# Patient Record
Sex: FEMALE | Race: OTHER | NOT HISPANIC OR LATINO | URBAN - METROPOLITAN AREA
[De-identification: names, ages, dates, MRNs, and addresses within clinical notes are randomized per-mention and may not be internally consistent; named-entity substitution may affect disease eponyms.]

---

## 2015-06-04 RX ORDER — FAMOTIDINE 10 MG/ML
1 INJECTION INTRAVENOUS
Qty: 0 | Refills: 0 | COMMUNITY
Start: 2015-06-04

## 2017-12-01 ENCOUNTER — OUTPATIENT (OUTPATIENT)
Dept: OUTPATIENT SERVICES | Facility: HOSPITAL | Age: 82
LOS: 1 days | End: 2017-12-01
Payer: MEDICARE

## 2017-12-01 PROCEDURE — G9001: CPT

## 2017-12-18 ENCOUNTER — INPATIENT (INPATIENT)
Facility: HOSPITAL | Age: 82
LOS: 3 days | Discharge: HOME | End: 2017-12-22
Attending: HOSPITALIST | Admitting: INTERNAL MEDICINE
Payer: MEDICARE

## 2017-12-18 VITALS
WEIGHT: 59.97 LBS | RESPIRATION RATE: 24 BRPM | OXYGEN SATURATION: 91 % | HEART RATE: 101 BPM | HEIGHT: 63 IN | DIASTOLIC BLOOD PRESSURE: 36 MMHG | SYSTOLIC BLOOD PRESSURE: 68 MMHG

## 2017-12-18 LAB
APTT BLD: 31.9 SEC — SIGNIFICANT CHANGE UP (ref 27.5–37.4)
INR BLD: 1.11 RATIO — SIGNIFICANT CHANGE UP (ref 0.88–1.16)
PROTHROM AB SERPL-ACNC: 12.1 SEC — SIGNIFICANT CHANGE UP (ref 9.8–12.7)

## 2017-12-18 PROCEDURE — 99291 CRITICAL CARE FIRST HOUR: CPT

## 2017-12-18 RX ORDER — VANCOMYCIN HCL 1 G
750 VIAL (EA) INTRAVENOUS ONCE
Qty: 0 | Refills: 0 | Status: COMPLETED | OUTPATIENT
Start: 2017-12-18 | End: 2017-12-18

## 2017-12-18 RX ORDER — PIPERACILLIN AND TAZOBACTAM 4; .5 G/20ML; G/20ML
3.38 INJECTION, POWDER, LYOPHILIZED, FOR SOLUTION INTRAVENOUS ONCE
Qty: 0 | Refills: 0 | Status: COMPLETED | OUTPATIENT
Start: 2017-12-18 | End: 2017-12-18

## 2017-12-18 RX ORDER — SODIUM CHLORIDE 9 MG/ML
3 INJECTION INTRAMUSCULAR; INTRAVENOUS; SUBCUTANEOUS ONCE
Qty: 0 | Refills: 0 | Status: COMPLETED | OUTPATIENT
Start: 2017-12-18 | End: 2017-12-18

## 2017-12-18 RX ORDER — ACETAMINOPHEN 500 MG
650 TABLET ORAL ONCE
Qty: 0 | Refills: 0 | Status: COMPLETED | OUTPATIENT
Start: 2017-12-18 | End: 2017-12-18

## 2017-12-18 RX ORDER — SODIUM CHLORIDE 9 MG/ML
1000 INJECTION INTRAMUSCULAR; INTRAVENOUS; SUBCUTANEOUS ONCE
Qty: 0 | Refills: 0 | Status: COMPLETED | OUTPATIENT
Start: 2017-12-18 | End: 2017-12-18

## 2017-12-18 RX ADMIN — SODIUM CHLORIDE 3 MILLILITER(S): 9 INJECTION INTRAMUSCULAR; INTRAVENOUS; SUBCUTANEOUS at 23:57

## 2017-12-18 NOTE — ED ADULT NURSE NOTE - OBJECTIVE STATEMENT
89 y/o female PMHx hypothyroidism, HTN presents to the ED with weakness, as per daughters patient has not been eating or drinking for three days and has been groaning 89 y/o female PMHx hypothyroidism, HTN presents to the ED with weakness, as per daughters patient has not been eating or drinking for three days and has been groaning. L- arm contracted, b/l leg contracted

## 2017-12-19 LAB
ALBUMIN SERPL ELPH-MCNC: 2 G/DL — LOW (ref 3.3–5)
ALP SERPL-CCNC: 67 U/L — SIGNIFICANT CHANGE UP (ref 40–120)
ALT FLD-CCNC: 46 U/L — SIGNIFICANT CHANGE UP (ref 12–78)
ANION GAP SERPL CALC-SCNC: 7 MMOL/L — SIGNIFICANT CHANGE UP (ref 5–17)
ANISOCYTOSIS BLD QL: SLIGHT — SIGNIFICANT CHANGE UP
APPEARANCE UR: CLEAR — SIGNIFICANT CHANGE UP
AST SERPL-CCNC: 23 U/L — SIGNIFICANT CHANGE UP (ref 15–37)
BACTERIA # UR AUTO: ABNORMAL
BASOPHILS NFR BLD AUTO: 1 % — SIGNIFICANT CHANGE UP (ref 0–2)
BILIRUB SERPL-MCNC: 0.5 MG/DL — SIGNIFICANT CHANGE UP (ref 0.2–1.2)
BILIRUB UR-MCNC: NEGATIVE — SIGNIFICANT CHANGE UP
BUN SERPL-MCNC: 101 MG/DL — HIGH (ref 7–23)
BUN SERPL-MCNC: 70 MG/DL — HIGH (ref 7–23)
BUN SERPL-MCNC: 81 MG/DL — HIGH (ref 7–23)
BUN SERPL-MCNC: 91 MG/DL — HIGH (ref 7–23)
CA-I BLD-SCNC: 1.18 MMOL/L — SIGNIFICANT CHANGE UP (ref 1.12–1.3)
CALCIUM SERPL-MCNC: 7.1 MG/DL — LOW (ref 8.5–10.1)
CALCIUM SERPL-MCNC: 7.2 MG/DL — LOW (ref 8.5–10.1)
CALCIUM SERPL-MCNC: 7.6 MG/DL — LOW (ref 8.5–10.1)
CALCIUM SERPL-MCNC: 8.3 MG/DL — LOW (ref 8.5–10.1)
CHLORIDE SERPL-SCNC: 142 MMOL/L — HIGH (ref 96–108)
CHLORIDE SERPL-SCNC: 144 MMOL/L — HIGH (ref 96–108)
CHLORIDE SERPL-SCNC: 147 MMOL/L — HIGH (ref 96–108)
CHLORIDE SERPL-SCNC: 147 MMOL/L — HIGH (ref 96–108)
CK MB BLD-MCNC: <1 % — SIGNIFICANT CHANGE UP (ref 0–3.5)
CK MB CFR SERPL CALC: <0.5 NG/ML — SIGNIFICANT CHANGE UP (ref 0.5–3.6)
CK SERPL-CCNC: 49 U/L — SIGNIFICANT CHANGE UP (ref 26–192)
CO2 SERPL-SCNC: 22 MMOL/L — SIGNIFICANT CHANGE UP (ref 22–31)
CO2 SERPL-SCNC: 23 MMOL/L — SIGNIFICANT CHANGE UP (ref 22–31)
CO2 SERPL-SCNC: 24 MMOL/L — SIGNIFICANT CHANGE UP (ref 22–31)
CO2 SERPL-SCNC: 25 MMOL/L — SIGNIFICANT CHANGE UP (ref 22–31)
COLOR SPEC: YELLOW — SIGNIFICANT CHANGE UP
COMMENT - URINE: SIGNIFICANT CHANGE UP
CREAT SERPL-MCNC: 1.58 MG/DL — HIGH (ref 0.5–1.3)
CREAT SERPL-MCNC: 1.83 MG/DL — HIGH (ref 0.5–1.3)
CREAT SERPL-MCNC: 1.94 MG/DL — HIGH (ref 0.5–1.3)
CREAT SERPL-MCNC: 2.29 MG/DL — HIGH (ref 0.5–1.3)
DIFF PNL FLD: ABNORMAL
DOHLE BOD BLD QL SMEAR: PRESENT — SIGNIFICANT CHANGE UP
EPI CELLS # UR: ABNORMAL
GLUCOSE SERPL-MCNC: 162 MG/DL — HIGH (ref 70–99)
GLUCOSE SERPL-MCNC: 211 MG/DL — HIGH (ref 70–99)
GLUCOSE SERPL-MCNC: 225 MG/DL — HIGH (ref 70–99)
GLUCOSE SERPL-MCNC: 241 MG/DL — HIGH (ref 70–99)
GLUCOSE UR QL: NEGATIVE MG/DL — SIGNIFICANT CHANGE UP
HCT VFR BLD CALC: 33.4 % — LOW (ref 34.5–45)
HCT VFR BLD CALC: 36.7 % — SIGNIFICANT CHANGE UP (ref 34.5–45)
HGB BLD-MCNC: 10.1 G/DL — LOW (ref 11.5–15.5)
HGB BLD-MCNC: 11.6 G/DL — SIGNIFICANT CHANGE UP (ref 11.5–15.5)
HYPOCHROMIA BLD QL: SLIGHT — SIGNIFICANT CHANGE UP
KETONES UR-MCNC: NEGATIVE — SIGNIFICANT CHANGE UP
LACTATE SERPL-SCNC: 3.3 MMOL/L — HIGH (ref 0.7–2)
LEUKOCYTE ESTERASE UR-ACNC: ABNORMAL
LIDOCAIN IGE QN: 142 U/L — SIGNIFICANT CHANGE UP (ref 73–393)
LYMPHOCYTES # BLD AUTO: 17 % — SIGNIFICANT CHANGE UP (ref 13–44)
MACROCYTES BLD QL: SLIGHT — SIGNIFICANT CHANGE UP
MAGNESIUM SERPL-MCNC: 3.3 MG/DL — HIGH (ref 1.6–2.6)
MCHC RBC-ENTMCNC: 30 PG — SIGNIFICANT CHANGE UP (ref 27–34)
MCHC RBC-ENTMCNC: 30.1 GM/DL — LOW (ref 32–36)
MCHC RBC-ENTMCNC: 31.3 PG — SIGNIFICANT CHANGE UP (ref 27–34)
MCHC RBC-ENTMCNC: 31.6 GM/DL — LOW (ref 32–36)
MCV RBC AUTO: 98.9 FL — SIGNIFICANT CHANGE UP (ref 80–100)
MCV RBC AUTO: 99.7 FL — SIGNIFICANT CHANGE UP (ref 80–100)
MICROCYTES BLD QL: SLIGHT — SIGNIFICANT CHANGE UP
MONOCYTES NFR BLD AUTO: 3 % — SIGNIFICANT CHANGE UP (ref 2–14)
NEUTROPHILS NFR BLD AUTO: 73 % — SIGNIFICANT CHANGE UP (ref 43–77)
NEUTS BAND # BLD: 6 % — SIGNIFICANT CHANGE UP (ref 0–8)
NITRITE UR-MCNC: NEGATIVE — SIGNIFICANT CHANGE UP
OVALOCYTES BLD QL SMEAR: SLIGHT — SIGNIFICANT CHANGE UP
PH UR: 5 — SIGNIFICANT CHANGE UP (ref 5–8)
PHOSPHATE SERPL-MCNC: 2.9 MG/DL — SIGNIFICANT CHANGE UP (ref 2.5–4.5)
PLAT MORPH BLD: NORMAL — SIGNIFICANT CHANGE UP
PLATELET # BLD AUTO: 122 K/UL — LOW (ref 150–400)
PLATELET # BLD AUTO: 137 K/UL — LOW (ref 150–400)
POTASSIUM SERPL-MCNC: 3.3 MMOL/L — LOW (ref 3.5–5.3)
POTASSIUM SERPL-MCNC: 3.4 MMOL/L — LOW (ref 3.5–5.3)
POTASSIUM SERPL-MCNC: 3.7 MMOL/L — SIGNIFICANT CHANGE UP (ref 3.5–5.3)
POTASSIUM SERPL-MCNC: 4.4 MMOL/L — SIGNIFICANT CHANGE UP (ref 3.5–5.3)
POTASSIUM SERPL-SCNC: 3.3 MMOL/L — LOW (ref 3.5–5.3)
POTASSIUM SERPL-SCNC: 3.4 MMOL/L — LOW (ref 3.5–5.3)
POTASSIUM SERPL-SCNC: 3.7 MMOL/L — SIGNIFICANT CHANGE UP (ref 3.5–5.3)
POTASSIUM SERPL-SCNC: 4.4 MMOL/L — SIGNIFICANT CHANGE UP (ref 3.5–5.3)
PROT SERPL-MCNC: 7.8 GM/DL — SIGNIFICANT CHANGE UP (ref 6–8.3)
PROT UR-MCNC: 30 MG/DL
RBC # BLD: 3.35 M/UL — LOW (ref 3.8–5.2)
RBC # BLD: 3.71 M/UL — LOW (ref 3.8–5.2)
RBC # FLD: 16 % — HIGH (ref 11–15)
RBC # FLD: 16.2 % — HIGH (ref 11–15)
RBC BLD AUTO: SIGNIFICANT CHANGE UP
RBC CASTS # UR COMP ASSIST: ABNORMAL /HPF (ref 0–4)
SMUDGE CELLS # BLD: PRESENT — SIGNIFICANT CHANGE UP
SODIUM SERPL-SCNC: 172 MMOL/L — CRITICAL HIGH (ref 135–145)
SODIUM SERPL-SCNC: 173 MMOL/L — CRITICAL HIGH (ref 135–145)
SODIUM SERPL-SCNC: 178 MMOL/L — CRITICAL HIGH (ref 135–145)
SODIUM SERPL-SCNC: 179 MMOL/L — CRITICAL HIGH (ref 135–145)
SP GR SPEC: 1.02 — SIGNIFICANT CHANGE UP (ref 1.01–1.02)
T3 SERPL-MCNC: 54 NG/DL — LOW (ref 80–200)
T4 AB SER-ACNC: 4.6 UG/DL — SIGNIFICANT CHANGE UP (ref 4.6–12)
TOXIC GRANULES BLD QL SMEAR: PRESENT — SIGNIFICANT CHANGE UP
TROPONIN I SERPL-MCNC: 0.06 NG/ML — HIGH (ref 0.01–0.04)
TSH SERPL-MCNC: 0.74 UIU/ML — SIGNIFICANT CHANGE UP (ref 0.36–3.74)
UROBILINOGEN FLD QL: NEGATIVE MG/DL — SIGNIFICANT CHANGE UP
WBC # BLD: 17.6 K/UL — HIGH (ref 3.8–10.5)
WBC # BLD: 19 K/UL — HIGH (ref 3.8–10.5)
WBC # FLD AUTO: 17.6 K/UL — HIGH (ref 3.8–10.5)
WBC # FLD AUTO: 19 K/UL — HIGH (ref 3.8–10.5)
WBC UR QL: SIGNIFICANT CHANGE UP

## 2017-12-19 PROCEDURE — 71010: CPT | Mod: 26

## 2017-12-19 PROCEDURE — 70450 CT HEAD/BRAIN W/O DYE: CPT | Mod: 26

## 2017-12-19 PROCEDURE — 93010 ELECTROCARDIOGRAM REPORT: CPT

## 2017-12-19 RX ORDER — POTASSIUM CHLORIDE 20 MEQ
10 PACKET (EA) ORAL ONCE
Qty: 0 | Refills: 0 | Status: COMPLETED | OUTPATIENT
Start: 2017-12-19 | End: 2017-12-19

## 2017-12-19 RX ORDER — SODIUM CHLORIDE 9 MG/ML
1000 INJECTION, SOLUTION INTRAVENOUS
Qty: 0 | Refills: 0 | Status: DISCONTINUED | OUTPATIENT
Start: 2017-12-19 | End: 2017-12-19

## 2017-12-19 RX ORDER — SODIUM CHLORIDE 9 MG/ML
1000 INJECTION, SOLUTION INTRAVENOUS
Qty: 0 | Refills: 0 | Status: DISCONTINUED | OUTPATIENT
Start: 2017-12-19 | End: 2017-12-21

## 2017-12-19 RX ORDER — POTASSIUM CHLORIDE 20 MEQ
10 PACKET (EA) ORAL
Qty: 0 | Refills: 0 | Status: COMPLETED | OUTPATIENT
Start: 2017-12-19 | End: 2017-12-20

## 2017-12-19 RX ORDER — LEVOTHYROXINE SODIUM 125 MCG
12.5 TABLET ORAL DAILY
Qty: 0 | Refills: 0 | Status: DISCONTINUED | OUTPATIENT
Start: 2017-12-19 | End: 2017-12-22

## 2017-12-19 RX ORDER — SODIUM CHLORIDE 9 MG/ML
1000 INJECTION INTRAMUSCULAR; INTRAVENOUS; SUBCUTANEOUS ONCE
Qty: 0 | Refills: 0 | Status: COMPLETED | OUTPATIENT
Start: 2017-12-19 | End: 2017-12-19

## 2017-12-19 RX ORDER — MIRTAZAPINE 45 MG/1
15 TABLET, ORALLY DISINTEGRATING ORAL AT BEDTIME
Qty: 0 | Refills: 0 | Status: DISCONTINUED | OUTPATIENT
Start: 2017-12-19 | End: 2017-12-22

## 2017-12-19 RX ORDER — HEPARIN SODIUM 5000 [USP'U]/ML
5000 INJECTION INTRAVENOUS; SUBCUTANEOUS EVERY 8 HOURS
Qty: 0 | Refills: 0 | Status: DISCONTINUED | OUTPATIENT
Start: 2017-12-19 | End: 2017-12-22

## 2017-12-19 RX ORDER — MORPHINE SULFATE 50 MG/1
1 CAPSULE, EXTENDED RELEASE ORAL ONCE
Qty: 0 | Refills: 0 | Status: DISCONTINUED | OUTPATIENT
Start: 2017-12-19 | End: 2017-12-19

## 2017-12-19 RX ADMIN — HEPARIN SODIUM 5000 UNIT(S): 5000 INJECTION INTRAVENOUS; SUBCUTANEOUS at 22:51

## 2017-12-19 RX ADMIN — Medication 650 MILLIGRAM(S): at 00:02

## 2017-12-19 RX ADMIN — SODIUM CHLORIDE 1000 MILLILITER(S): 9 INJECTION INTRAMUSCULAR; INTRAVENOUS; SUBCUTANEOUS at 08:59

## 2017-12-19 RX ADMIN — Medication 100 MILLIEQUIVALENT(S): at 00:51

## 2017-12-19 RX ADMIN — Medication 100 MILLIEQUIVALENT(S): at 23:15

## 2017-12-19 RX ADMIN — Medication 100 MILLIEQUIVALENT(S): at 14:16

## 2017-12-19 RX ADMIN — SODIUM CHLORIDE 1000 MILLILITER(S): 9 INJECTION INTRAMUSCULAR; INTRAVENOUS; SUBCUTANEOUS at 00:02

## 2017-12-19 RX ADMIN — PIPERACILLIN AND TAZOBACTAM 100 GRAM(S): 4; .5 INJECTION, POWDER, LYOPHILIZED, FOR SOLUTION INTRAVENOUS at 00:02

## 2017-12-19 RX ADMIN — Medication 150 MILLIGRAM(S): at 01:51

## 2017-12-19 RX ADMIN — MORPHINE SULFATE 1 MILLIGRAM(S): 50 CAPSULE, EXTENDED RELEASE ORAL at 13:27

## 2017-12-19 RX ADMIN — HEPARIN SODIUM 5000 UNIT(S): 5000 INJECTION INTRAVENOUS; SUBCUTANEOUS at 13:27

## 2017-12-19 RX ADMIN — Medication 12.5 MICROGRAM(S): at 08:54

## 2017-12-19 RX ADMIN — SODIUM CHLORIDE 100 MILLILITER(S): 9 INJECTION, SOLUTION INTRAVENOUS at 08:59

## 2017-12-19 RX ADMIN — SODIUM CHLORIDE 100 MILLILITER(S): 9 INJECTION, SOLUTION INTRAVENOUS at 08:28

## 2017-12-19 RX ADMIN — HEPARIN SODIUM 5000 UNIT(S): 5000 INJECTION INTRAVENOUS; SUBCUTANEOUS at 06:14

## 2017-12-19 RX ADMIN — MORPHINE SULFATE 1 MILLIGRAM(S): 50 CAPSULE, EXTENDED RELEASE ORAL at 13:57

## 2017-12-19 RX ADMIN — SODIUM CHLORIDE 75 MILLILITER(S): 9 INJECTION, SOLUTION INTRAVENOUS at 05:21

## 2017-12-19 RX ADMIN — SODIUM CHLORIDE 100 MILLILITER(S): 9 INJECTION, SOLUTION INTRAVENOUS at 18:48

## 2017-12-19 NOTE — PROVIDER CONTACT NOTE (EICU) - BACKGROUND
89 year old female with dementia, hypothryoism pancreatic tumor who p/w AMS. Noted to have FTT with DARIO, severe hypernatremia

## 2017-12-19 NOTE — ED PROVIDER NOTE - OBJECTIVE STATEMENT
Pertinent PMH/PSH/FHx/SHx and Review of Systems contained within:  89 yo bedbound f with PMH of dementia, hypothyroidism and htn BIB family for 4 day history of lethary and anorexia. As per family, patient usually able to answer questions but now only moans as if in pain.

## 2017-12-19 NOTE — ED PROVIDER NOTE - CARE PLAN
Principal Discharge DX:	Sepsis  Secondary Diagnosis:	Failure to thrive in adult Principal Discharge DX:	Hypernatremia  Secondary Diagnosis:	Failure to thrive in adult  Secondary Diagnosis:	UTI (urinary tract infection)

## 2017-12-19 NOTE — H&P ADULT - ATTENDING COMMENTS
pt seen and examined at the bedside during am rounds. pt admitted with hypernatremia, now on IVF, hypernatremia improving, cont to monitor, pt otherwise HD stable. pt is DNR/DNI, family doesn't want any aggressive measures.

## 2017-12-19 NOTE — ED PROVIDER NOTE - CRITICAL CARE PROVIDED
consultation with other physicians/direct patient care (not related to procedure)/conducted a detailed discussion of DNR status/additional history taking/interpretation of diagnostic studies/documentation/consult w/ pt's family directly relating to pts condition

## 2017-12-19 NOTE — H&P ADULT - NSHPPHYSICALEXAM_GEN_ALL_CORE
· CONSTITUTIONAL: cachectic, awake, moans with touch  · ENMT: Airway patent, Nasal mucosa clear. Mouth with normal mucosa. Throat has no vesicles, no oropharyngeal exudates and uvula is midline.  · EYES: Clear bilaterally, pupils equal, round and reactive to light.  · CARDIAC: Normal rate, regular rhythm.  Heart sounds S1, S2.  No murmurs, rubs or gallops.  · RESPIRATORY: Breath sounds clear and equal bilaterally.  · GASTROINTESTINAL: Abdomen soft, non-tender, no guarding.  · MUSCULOSKELETAL: Spine appears normal, atrophied and rigid extremities, no muscle or joint tenderness  · NEUROLOGICAL: no focal deficits, no motor deficits.  · SKIN: Skin normal color for race, warm, dry and intact. No evidence of rash.

## 2017-12-19 NOTE — H&P ADULT - ASSESSMENT
Assessment:  89F w/ Failure to Thrive, Dehydration, Electrolyte Disturbances including Hypernatremia.    - Admit to ICU for monitoring, hydration, normalization of electrolyte disturbances  - IV hydration with D5W for hypernatremia  - Await Urine culture results  - BMP q6h Assessment:  89F w/ Failure to Thrive, Dehydration, Electrolyte Disturbances including Hypernatremia.    - Admit to ICU for monitoring, hydration, normalization of electrolyte disturbances  - IV hydration with D5W for hypernatremia  - Await Urine culture results  - BMP q6h  - goals of care discussion with family

## 2017-12-19 NOTE — CHART NOTE - NSCHARTNOTEFT_GEN_A_CORE
Patient is a 89 year old female with history of Dementia, bed bound, Hypothyroidism, HTN, pancreatic tumor, DNR/ DNI brought in by family for increase lethargy and failure to thrive. In ED found Na of 159  given 2L of normal saline boluses, and now on D51/2 NS @100ml/hr,   Na slowly trending down from 179--178--173      signed out to Hospitalist.

## 2017-12-19 NOTE — H&P ADULT - HISTORY OF PRESENT ILLNESS
· Chief Complaint: The patient is a 89y Female complaining of weakness.  · HPI Objective Statement: Pertinent PMH/PSH/FHx/SHx and Review of Systems contained within:  89 yo bedbound f with PMH of dementia, hypothyroidism and htn BIB family for 4 day history of lethary and anorexia. As per family, patient usually able to answer questions but now only moans as if in pain.  Patient is a 89 yo female with PMHx HTN, Hypothyroid, Dementia (bed bound and minimally verbal at baseline) who was brought in by family with 4 day history of increased lethargy and anorexia. Patient normally answers yes and no questions and recently has been only moaning in response to questions or pain. Has not been eating or as interactive as normal. Labs on admission show Na 179, Cl 147 and BUN / Cr of 101 / 2.29.  Patient given IVF and IV Zosyn and Vanco in the ED.

## 2017-12-19 NOTE — H&P ADULT - NSHPLABSRESULTS_GEN_ALL_CORE
11.6   19.0  )-----------( 137      ( 18 Dec 2017 23:44 )             36.7     12-18    179<HH>  |  147<H>  |  101<H>  ----------------------------<  241<H>  4.4   |  25  |  2.29<H>    Ca    8.3<L>      18 Dec 2017 23:44    TPro  7.8  /  Alb  2.0<L>  /  TBili  0.5  /  DBili  x   /  AST  23  /  ALT  46  /  AlkPhos  67  12    PT/INR - ( 18 Dec 2017 23:44 )   PT: 12.1 sec;   INR: 1.11 ratio         PTT - ( 18 Dec 2017 23:44 )  PTT:31.9 sec    Urinalysis Basic - ( 19 Dec 2017 02:55 )    Color: Yellow / Appearance: Clear / S.020 / pH: x  Gluc: x / Ketone: Negative  / Bili: Negative / Urobili: Negative mg/dL   Blood: x / Protein: 30 mg/dL / Nitrite: Negative   Leuk Esterase: Trace / RBC: 25-50 /HPF / WBC 3-5   Sq Epi: x / Non Sq Epi: Moderate / Bacteria: Many      < from: CT Head No Cont (17 @ 02:37) >    EXAM:  CT BRAIN                          PROCEDURE DATE:  2017    INTERPRETATION:  2017 3:09 AM    CLINICAL HISTORY:  Altered mental status.    TECHNIQUE: Axial CT images are obtained from the cranial vertex to the   skullbase without the administration of IV contrast.    COMPARISON: CT 2009 and brain MRI 2009    FINDINGS:  There is volume loss and atherosclerosis. White matter hypodensities   noted compatible with severe chronic microvascular ischemic change in   this age group. There is no midline shift, mass effect, extra axial   collection, intracranial hemorrhage, or ventriculomegaly. There are   likely chronic lacunar infarcts along the basal ganglia.    The calvarium is intact. The visualized paranasal sinuses are aerated.   The mastoid air cells are clear.    IMPRESSION:    No acute hemorrhage or mass effect. Chronic changes noted    An MRI would be more sensitive for the detection of an acute infarct on   this background if there are no contraindications.

## 2017-12-19 NOTE — ED PROVIDER NOTE - MEDICAL DECISION MAKING DETAILS
Pending ICU eval, EKG and Urine. Patient care transitioned to incoming team.  All decisions regarding the progression of care will be made at their discretion.

## 2017-12-20 DIAGNOSIS — E43 UNSPECIFIED SEVERE PROTEIN-CALORIE MALNUTRITION: ICD-10-CM

## 2017-12-20 DIAGNOSIS — N39.0 URINARY TRACT INFECTION, SITE NOT SPECIFIED: ICD-10-CM

## 2017-12-20 DIAGNOSIS — E87.0 HYPEROSMOLALITY AND HYPERNATREMIA: ICD-10-CM

## 2017-12-20 DIAGNOSIS — F03.90 UNSPECIFIED DEMENTIA WITHOUT BEHAVIORAL DISTURBANCE: ICD-10-CM

## 2017-12-20 DIAGNOSIS — R69 ILLNESS, UNSPECIFIED: ICD-10-CM

## 2017-12-20 DIAGNOSIS — I10 ESSENTIAL (PRIMARY) HYPERTENSION: ICD-10-CM

## 2017-12-20 DIAGNOSIS — D49.0 NEOPLASM OF UNSPECIFIED BEHAVIOR OF DIGESTIVE SYSTEM: ICD-10-CM

## 2017-12-20 DIAGNOSIS — E03.9 HYPOTHYROIDISM, UNSPECIFIED: ICD-10-CM

## 2017-12-20 LAB
ANION GAP SERPL CALC-SCNC: 8 MMOL/L — SIGNIFICANT CHANGE UP (ref 5–17)
BUN SERPL-MCNC: 61 MG/DL — HIGH (ref 7–23)
CALCIUM SERPL-MCNC: 6.9 MG/DL — LOW (ref 8.5–10.1)
CHLORIDE SERPL-SCNC: 140 MMOL/L — HIGH (ref 96–108)
CO2 SERPL-SCNC: 20 MMOL/L — LOW (ref 22–31)
CREAT SERPL-MCNC: 1.37 MG/DL — HIGH (ref 0.5–1.3)
CULTURE RESULTS: NO GROWTH — SIGNIFICANT CHANGE UP
GLUCOSE SERPL-MCNC: 157 MG/DL — HIGH (ref 70–99)
HCT VFR BLD CALC: 31.6 % — LOW (ref 34.5–45)
HGB BLD-MCNC: 9.9 G/DL — LOW (ref 11.5–15.5)
MAGNESIUM SERPL-MCNC: 2.6 MG/DL — SIGNIFICANT CHANGE UP (ref 1.6–2.6)
MCHC RBC-ENTMCNC: 30.7 PG — SIGNIFICANT CHANGE UP (ref 27–34)
MCHC RBC-ENTMCNC: 31.3 GM/DL — LOW (ref 32–36)
MCV RBC AUTO: 98.1 FL — SIGNIFICANT CHANGE UP (ref 80–100)
PHOSPHATE SERPL-MCNC: 1.8 MG/DL — LOW (ref 2.5–4.5)
PLATELET # BLD AUTO: 103 K/UL — LOW (ref 150–400)
POTASSIUM SERPL-MCNC: 4 MMOL/L — SIGNIFICANT CHANGE UP (ref 3.5–5.3)
POTASSIUM SERPL-SCNC: 4 MMOL/L — SIGNIFICANT CHANGE UP (ref 3.5–5.3)
RBC # BLD: 3.22 M/UL — LOW (ref 3.8–5.2)
RBC # FLD: 15.6 % — HIGH (ref 11–15)
SODIUM SERPL-SCNC: 168 MMOL/L — CRITICAL HIGH (ref 135–145)
SPECIMEN SOURCE: SIGNIFICANT CHANGE UP
WBC # BLD: 18.4 K/UL — HIGH (ref 3.8–10.5)
WBC # FLD AUTO: 18.4 K/UL — HIGH (ref 3.8–10.5)

## 2017-12-20 PROCEDURE — 99233 SBSQ HOSP IP/OBS HIGH 50: CPT

## 2017-12-20 RX ORDER — POTASSIUM PHOSPHATE, MONOBASIC POTASSIUM PHOSPHATE, DIBASIC 236; 224 MG/ML; MG/ML
15 INJECTION, SOLUTION INTRAVENOUS ONCE
Qty: 0 | Refills: 0 | Status: COMPLETED | OUTPATIENT
Start: 2017-12-20 | End: 2017-12-20

## 2017-12-20 RX ORDER — ACETAMINOPHEN 500 MG
650 TABLET ORAL EVERY 6 HOURS
Qty: 0 | Refills: 0 | Status: DISCONTINUED | OUTPATIENT
Start: 2017-12-20 | End: 2017-12-22

## 2017-12-20 RX ADMIN — Medication 100 MILLIEQUIVALENT(S): at 01:01

## 2017-12-20 RX ADMIN — POTASSIUM PHOSPHATE, MONOBASIC POTASSIUM PHOSPHATE, DIBASIC 63.75 MILLIMOLE(S): 236; 224 INJECTION, SOLUTION INTRAVENOUS at 06:00

## 2017-12-20 RX ADMIN — Medication 12.5 MICROGRAM(S): at 05:50

## 2017-12-20 RX ADMIN — Medication 650 MILLIGRAM(S): at 23:50

## 2017-12-20 RX ADMIN — HEPARIN SODIUM 5000 UNIT(S): 5000 INJECTION INTRAVENOUS; SUBCUTANEOUS at 05:50

## 2017-12-20 RX ADMIN — HEPARIN SODIUM 5000 UNIT(S): 5000 INJECTION INTRAVENOUS; SUBCUTANEOUS at 15:47

## 2017-12-20 RX ADMIN — Medication 650 MILLIGRAM(S): at 23:00

## 2017-12-20 RX ADMIN — SODIUM CHLORIDE 100 MILLILITER(S): 9 INJECTION, SOLUTION INTRAVENOUS at 15:46

## 2017-12-20 RX ADMIN — HEPARIN SODIUM 5000 UNIT(S): 5000 INJECTION INTRAVENOUS; SUBCUTANEOUS at 22:48

## 2017-12-20 RX ADMIN — MIRTAZAPINE 15 MILLIGRAM(S): 45 TABLET, ORALLY DISINTEGRATING ORAL at 22:48

## 2017-12-20 NOTE — CHART NOTE - NSCHARTNOTEFT_GEN_A_CORE
Upon Nutritional Assessment by the Registered Dietitian your patient was determined to meet criteria / has evidence of the following diagnosis/diagnoses:          [ ]  Mild Protein Calorie Malnutrition        [ ]  Moderate Protein Calorie Malnutrition        [x ] Severe Protein Calorie Malnutrition        [ ] Unspecified Protein Calorie Malnutrition        [x ] Underweight / BMI <19        [ ] Morbid Obesity / BMI > 40      Findings as based on:  •  Comprehensive nutrition assessment and consultation  •  Calorie counts (nutrient intake analysis)  •  Food acceptance and intake status from observations by staff  •  Follow up  •  Patient education  •  Intervention secondary to interdisciplinary rounds  •   concerns      Treatment:    The following diet has been recommended:  Continue NPO @ this time    PROVIDER Section:     By signing this assessment you are acknowledging and agree with the diagnosis/diagnoses assigned by the Registered Dietitian    Comments:

## 2017-12-20 NOTE — DIETITIAN INITIAL EVALUATION ADULT. - FACTORS AFF FOOD INTAKE
change in mental status/difficulty chewing/difficulty swallowing persistent lack of appetite/other (specify)/change in mental status/Lethargy

## 2017-12-20 NOTE — DIETITIAN INITIAL EVALUATION ADULT. - ENERGY NEEDS
Height (cm): 160.02 (12-18)  Weight (kg): 27.2 (12-18)  BMI (kg/m2): 10.6 (12-18)  IBW:   % IBW: Height (cm): 160.02 (12-18)  Weight (kg): 30.6 (12-18)  BMI (kg/m2): 11.9 (12-18)  IBW: 52 kg   % IBW: 59%

## 2017-12-20 NOTE — PROGRESS NOTE ADULT - SUBJECTIVE AND OBJECTIVE BOX
Patient is a 89y old  Female who presents with UTI and severe hypernatremia with ho panc mass        SUBJECTIVE / OVERNIGHT EVENTS: lethargic, mostly unresponsive; spoke with daughter, Winter who is interested in hospice      MEDICATIONS  (STANDING):  dextrose 5% + sodium chloride 0.45%. 1000 milliLiter(s) (100 mL/Hr) IV Continuous <Continuous>  heparin  Injectable 5000 Unit(s) SubCutaneous every 8 hours  levothyroxine Injectable 12.5 MICROGram(s) IV Push daily  mirtazapine Soltab 15 milliGRAM(s) Oral at bedtime    MEDICATIONS  (PRN):      Vital Signs Last 24 Hrs  T(F): 98 (20 Dec 2017 12:00), Max: 98.4 (20 Dec 2017 04:00)  HR: 85 (20 Dec 2017 12:00) (71 - 88)  BP: 137/76 (20 Dec 2017 12:00) (120/66 - 153/79)  RR: 21 (20 Dec 2017 12:00) (15 - 25)  SpO2: 98% (20 Dec 2017 12:00) (97% - 100%)              PHYSICAL EXAM  GENERAL: NAD, frail, cachetic  HEAD:  Atraumatic, Normocephalic  CHEST/LUNG: b.l AE, dec at bases  HEART: Regular rate and rhythm; No murmurs, rubs, or gallops  ABDOMEN: Soft, Nontender, Nondistended; Bowel sounds present  EXTREMITIES:  contracted      LABS:                        9.9    18.4  )-----------( 103      ( 20 Dec 2017 04:20 )             31.6     12-20    168<HH>  |  140<H>  |  61<H>  ----------------------------<  157<H>  4.0   |  20<L>  |  1.37<H>    Ca    6.9<L>      20 Dec 2017 04:20  Phos  1.8     12-20  Mg     2.6     12-20

## 2017-12-20 NOTE — DIETITIAN INITIAL EVALUATION ADULT. - PERTINENT LABORATORY DATA
12-20 Na168 mmol/L<HH> Glu 157 mg/dL<H> K+ 4.0 mmol/L Cr  1.37 mg/dL<H> BUN 61 mg/dL<H> Phos 1.8 mg/dL<L> Alb n/a   PAB n/a 12-20 Na168 mmol/L<HH> Glu 157 mg/dL<H> K+ 4.0 mmol/L Cr  1.37 mg/dL<H> BUN 61 mg/dL<H> Phos 1.8 mg/dL<L> Alb n/a   PAB n/a, WBC=18.4

## 2017-12-20 NOTE — PROGRESS NOTE ADULT - ASSESSMENT
89 yo female with ho dementia, panc mass a/w UTI and severe hypernatremia. Family does not wish for aggressive care

## 2017-12-20 NOTE — DIETITIAN INITIAL EVALUATION ADULT. - ETIOLOGY
inadequate protein-energy intake presents with failure to thrive/anorexia inadequate protein-energy intake presents with dementia with failure to thrive/anorexia & hx pancreatic tumor

## 2017-12-20 NOTE — DIETITIAN INITIAL EVALUATION ADULT. - NUTRITIONGOAL OUTCOME1
Pt will be provided with nutrition within her wishes for care Pt will be provided with nutrition within her wishes for health care proxy

## 2017-12-20 NOTE — DIETITIAN INITIAL EVALUATION ADULT. - OTHER INFO
Pt seen for RN consult 12/19 for BMI<18 and difficulty chewing/swallowing & FTT. Pt seen for RN consult 12/19 for BMI<18 and difficulty chewing/swallowing & FTT. Pt has HHA 12hrs/ 7 days. Pt seen for RN consult 12/19 for BMI<18 and difficulty chewing/swallowing & FTT. Pt has HHA 12hrs/ 7 days. Pt edentulous with anorexia & lethargy remains NPO x 2 days. Pt seen for RN consult 12/19 for BMI<18 and difficulty chewing/swallowing & FTT. Pt has HHA 12hrs/ 7 days. Pt edentulous & bedbound PTA with anorexia & lethargy remains NPO x 2 days.

## 2017-12-20 NOTE — DIETITIAN INITIAL EVALUATION ADULT. - PHYSICAL APPEARANCE
emaciated/contracted/debilitated/BMI=11.9; +1 generalized edema; Nutrition focused physical exam conducted ; found signs of malnutrition [ ]absent [x]present.  Subcutaneous fat loss: [severe] Orbital fat pads region, [unable ]Buccal fat region, [severe ]Triceps region,  [ severe]Ribs region.  Muscle wasting: [severe ]Temples region, [severe ]Clavicle region, [severe ]Shoulder region, [severe ]Scapula region, [ severe]Interosseous region,  [ severe]thigh region, [severe ]Calf region

## 2017-12-21 ENCOUNTER — TRANSCRIPTION ENCOUNTER (OUTPATIENT)
Age: 82
End: 2017-12-21

## 2017-12-21 DIAGNOSIS — A41.9 SEPSIS, UNSPECIFIED ORGANISM: ICD-10-CM

## 2017-12-21 DIAGNOSIS — J15.6 PNEUMONIA DUE TO OTHER GRAM-NEGATIVE BACTERIA: ICD-10-CM

## 2017-12-21 LAB
ANION GAP SERPL CALC-SCNC: 8 MMOL/L — SIGNIFICANT CHANGE UP (ref 5–17)
BUN SERPL-MCNC: 36 MG/DL — HIGH (ref 7–23)
CALCIUM SERPL-MCNC: 7.1 MG/DL — LOW (ref 8.5–10.1)
CHLORIDE SERPL-SCNC: 128 MMOL/L — HIGH (ref 96–108)
CO2 SERPL-SCNC: 20 MMOL/L — LOW (ref 22–31)
CREAT SERPL-MCNC: 1.28 MG/DL — SIGNIFICANT CHANGE UP (ref 0.5–1.3)
GLUCOSE SERPL-MCNC: 189 MG/DL — HIGH (ref 70–99)
HCT VFR BLD CALC: 31.5 % — LOW (ref 34.5–45)
HGB BLD-MCNC: 10.1 G/DL — LOW (ref 11.5–15.5)
MCHC RBC-ENTMCNC: 30.6 PG — SIGNIFICANT CHANGE UP (ref 27–34)
MCHC RBC-ENTMCNC: 32 GM/DL — SIGNIFICANT CHANGE UP (ref 32–36)
MCV RBC AUTO: 95.5 FL — SIGNIFICANT CHANGE UP (ref 80–100)
PLATELET # BLD AUTO: 102 K/UL — LOW (ref 150–400)
POTASSIUM SERPL-MCNC: 3.2 MMOL/L — LOW (ref 3.5–5.3)
POTASSIUM SERPL-SCNC: 3.2 MMOL/L — LOW (ref 3.5–5.3)
RBC # BLD: 3.3 M/UL — LOW (ref 3.8–5.2)
RBC # FLD: 15.1 % — HIGH (ref 11–15)
SODIUM SERPL-SCNC: 156 MMOL/L — HIGH (ref 135–145)
WBC # BLD: 13.5 K/UL — HIGH (ref 3.8–10.5)
WBC # FLD AUTO: 13.5 K/UL — HIGH (ref 3.8–10.5)

## 2017-12-21 PROCEDURE — 99233 SBSQ HOSP IP/OBS HIGH 50: CPT

## 2017-12-21 RX ORDER — MAGNESIUM HYDROXIDE 400 MG/1
30 TABLET, CHEWABLE ORAL DAILY
Qty: 0 | Refills: 0 | Status: DISCONTINUED | OUTPATIENT
Start: 2017-12-21 | End: 2017-12-22

## 2017-12-21 RX ORDER — POTASSIUM PHOSPHATE, MONOBASIC POTASSIUM PHOSPHATE, DIBASIC 236; 224 MG/ML; MG/ML
15 INJECTION, SOLUTION INTRAVENOUS ONCE
Qty: 0 | Refills: 0 | Status: COMPLETED | OUTPATIENT
Start: 2017-12-21 | End: 2017-12-21

## 2017-12-21 RX ORDER — SENNA PLUS 8.6 MG/1
5 TABLET ORAL AT BEDTIME
Qty: 0 | Refills: 0 | Status: DISCONTINUED | OUTPATIENT
Start: 2017-12-21 | End: 2017-12-22

## 2017-12-21 RX ORDER — LANOLIN ALCOHOL/MO/W.PET/CERES
3 CREAM (GRAM) TOPICAL AT BEDTIME
Qty: 0 | Refills: 0 | Status: DISCONTINUED | OUTPATIENT
Start: 2017-12-21 | End: 2017-12-22

## 2017-12-21 RX ORDER — DOCUSATE SODIUM 100 MG
100 CAPSULE ORAL
Qty: 0 | Refills: 0 | Status: DISCONTINUED | OUTPATIENT
Start: 2017-12-21 | End: 2017-12-22

## 2017-12-21 RX ORDER — SODIUM CHLORIDE 9 MG/ML
1000 INJECTION, SOLUTION INTRAVENOUS
Qty: 0 | Refills: 0 | Status: DISCONTINUED | OUTPATIENT
Start: 2017-12-21 | End: 2017-12-22

## 2017-12-21 RX ORDER — POTASSIUM CHLORIDE 20 MEQ
10 PACKET (EA) ORAL
Qty: 0 | Refills: 0 | Status: COMPLETED | OUTPATIENT
Start: 2017-12-21 | End: 2017-12-21

## 2017-12-21 RX ADMIN — SODIUM CHLORIDE 100 MILLILITER(S): 9 INJECTION, SOLUTION INTRAVENOUS at 03:08

## 2017-12-21 RX ADMIN — Medication 100 MILLIGRAM(S): at 18:00

## 2017-12-21 RX ADMIN — HEPARIN SODIUM 5000 UNIT(S): 5000 INJECTION INTRAVENOUS; SUBCUTANEOUS at 22:17

## 2017-12-21 RX ADMIN — MIRTAZAPINE 15 MILLIGRAM(S): 45 TABLET, ORALLY DISINTEGRATING ORAL at 22:17

## 2017-12-21 RX ADMIN — POTASSIUM PHOSPHATE, MONOBASIC POTASSIUM PHOSPHATE, DIBASIC 63.75 MILLIMOLE(S): 236; 224 INJECTION, SOLUTION INTRAVENOUS at 09:58

## 2017-12-21 RX ADMIN — Medication 100 MILLIEQUIVALENT(S): at 15:59

## 2017-12-21 RX ADMIN — Medication 100 MILLIEQUIVALENT(S): at 20:16

## 2017-12-21 RX ADMIN — SODIUM CHLORIDE 75 MILLILITER(S): 9 INJECTION, SOLUTION INTRAVENOUS at 13:57

## 2017-12-21 RX ADMIN — Medication 650 MILLIGRAM(S): at 18:39

## 2017-12-21 RX ADMIN — Medication 1 ENEMA: at 15:59

## 2017-12-21 RX ADMIN — SODIUM CHLORIDE 100 MILLILITER(S): 9 INJECTION, SOLUTION INTRAVENOUS at 12:31

## 2017-12-21 RX ADMIN — Medication 100 MILLIEQUIVALENT(S): at 18:00

## 2017-12-21 RX ADMIN — HEPARIN SODIUM 5000 UNIT(S): 5000 INJECTION INTRAVENOUS; SUBCUTANEOUS at 05:18

## 2017-12-21 RX ADMIN — Medication 12.5 MICROGRAM(S): at 05:18

## 2017-12-21 RX ADMIN — HEPARIN SODIUM 5000 UNIT(S): 5000 INJECTION INTRAVENOUS; SUBCUTANEOUS at 13:59

## 2017-12-21 RX ADMIN — Medication 650 MILLIGRAM(S): at 17:58

## 2017-12-21 NOTE — DISCHARGE NOTE ADULT - SECONDARY DIAGNOSIS.
Dementia Gram-negative pneumonia Hypertension Severe protein-calorie malnutrition UTI (urinary tract infection) Failure to thrive in adult

## 2017-12-21 NOTE — PROGRESS NOTE ADULT - SUBJECTIVE AND OBJECTIVE BOX
Patient is a 89y old  Female who presents with metabolic enceph due to severe hyponatremia        SUBJECTIVE / OVERNIGHT EVENTS: family at bedside, pt sleepy, not taking much PO      MEDICATIONS  (STANDING):  dextrose 5% + sodium chloride 0.45%. 1000 milliLiter(s) (100 mL/Hr) IV Continuous <Continuous>  heparin  Injectable 5000 Unit(s) SubCutaneous every 8 hours  levothyroxine Injectable 12.5 MICROGram(s) IV Push daily  mirtazapine Soltab 15 milliGRAM(s) Oral at bedtime    MEDICATIONS  (PRN):  acetaminophen   Tablet. 650 milliGRAM(s) Oral every 6 hours PRN Mild Pain (1 - 3)      Vital Signs Last 24 Hrs  T(F): 98.4 (21 Dec 2017 05:34), Max: 99 (20 Dec 2017 22:37)  HR: 99 (21 Dec 2017 05:34) (71 - 106)  BP: 128/81 (21 Dec 2017 05:34) (91/56 - 155/94)  RR: 18 (21 Dec 2017 05:34) (18 - 20)  SpO2: 99% (21 Dec 2017 05:34) (98% - 100%)          PHYSICAL EXAM  GENERAL: NAD, frail  HEAD:  Atraumatic, Normocephalic  CHEST/LUNG: Clear to auscultation bilaterally; No wheeze  HEART: Regular rate and rhythm; No murmurs, rubs, or gallops  ABDOMEN: Soft, Nontender, Nondistended; Bowel sounds present  EXTREMITIES:  contracted  PSYCH: non verbal      LABS:                        10.1   13.5  )-----------( 102      ( 21 Dec 2017 10:27 )             31.5     12-21    156<H>  |  128<H>  |  36<H>  ----------------------------<  189<H>  3.2<L>   |  20<L>  |  1.28    Ca    7.1<L>      21 Dec 2017 10:27                  RADIOLOGY & ADDITIONAL TESTS:    Imaging Personally Reviewed:  Consultant(s) Notes Reviewed:    Care Discussed with Consultants/Other Providers: Patient is a 89y old  Female who presents with metabolic enceph due to severe hyponatremia, sepsis POA likely from gram neg pna        SUBJECTIVE / OVERNIGHT EVENTS: family at bedside, pt sleepy, not taking much PO      MEDICATIONS  (STANDING):  dextrose 5% + sodium chloride 0.45%. 1000 milliLiter(s) (100 mL/Hr) IV Continuous <Continuous>  heparin  Injectable 5000 Unit(s) SubCutaneous every 8 hours  levothyroxine Injectable 12.5 MICROGram(s) IV Push daily  mirtazapine Soltab 15 milliGRAM(s) Oral at bedtime    MEDICATIONS  (PRN):  acetaminophen   Tablet. 650 milliGRAM(s) Oral every 6 hours PRN Mild Pain (1 - 3)      Vital Signs Last 24 Hrs  T(F): 98.4 (21 Dec 2017 05:34), Max: 99 (20 Dec 2017 22:37)  HR: 99 (21 Dec 2017 05:34) (71 - 106)  BP: 128/81 (21 Dec 2017 05:34) (91/56 - 155/94)  RR: 18 (21 Dec 2017 05:34) (18 - 20)  SpO2: 99% (21 Dec 2017 05:34) (98% - 100%)          PHYSICAL EXAM  GENERAL: NAD, frail  HEAD:  Atraumatic, Normocephalic  CHEST/LUNG: Clear to auscultation bilaterally; No wheeze  HEART: Regular rate and rhythm; No murmurs, rubs, or gallops  ABDOMEN: Soft, Nontender, Nondistended; Bowel sounds present  EXTREMITIES:  contracted  PSYCH: non verbal      LABS:                        10.1   13.5  )-----------( 102      ( 21 Dec 2017 10:27 )             31.5     12-21    156<H>  |  128<H>  |  36<H>  ----------------------------<  189<H>  3.2<L>   |  20<L>  |  1.28    Ca    7.1<L>      21 Dec 2017 10:27                  RADIOLOGY & ADDITIONAL TESTS:    Imaging Personally Reviewed:  Consultant(s) Notes Reviewed:    Care Discussed with Consultants/Other Providers:

## 2017-12-21 NOTE — DISCHARGE NOTE ADULT - CARE PLAN
Principal Discharge DX:	Hypernatremia  Goal:	For hospice comfort care  Instructions for follow-up, activity and diet:	For hospice comfort care  Secondary Diagnosis:	Dementia  Secondary Diagnosis:	Gram-negative pneumonia  Secondary Diagnosis:	Hypertension  Secondary Diagnosis:	Severe protein-calorie malnutrition  Secondary Diagnosis:	UTI (urinary tract infection)  Secondary Diagnosis:	Failure to thrive in adult

## 2017-12-21 NOTE — DISCHARGE NOTE ADULT - PATIENT PORTAL LINK FT
“You can access the [facility] Patient Portal, offered by [facility], by registering with the following website: [email or web address]”

## 2017-12-21 NOTE — DISCHARGE NOTE ADULT - MEDICATION SUMMARY - MEDICATIONS TO STOP TAKING
I will STOP taking the medications listed below when I get home from the hospital:    Namenda 5 mg oral tablet  -- 1 tab(s) by mouth 2 times a day    Multiple Vitamins oral tablet  -- 1 tab(s) by mouth once a day    ascorbic acid 500 mg oral tablet  -- 1 tab(s) by mouth 2 times a day    folic acid 1 mg oral tablet  -- 1 tab(s) by mouth once a day

## 2017-12-21 NOTE — DISCHARGE NOTE ADULT - HOSPITAL COURSE
89 yo female with ho dementia, panc mass a/w UTI and severe hypernatremia. Family does not wish for aggressive care    Pt for hospice comfort care    Problem/Plan - 1:  ·  Problem: Hypernatremia.  Plan: will change to D5W  d/w family, no interventions including NG or PEG.     Problem/Plan - 2:  ·  Problem: Severe protein-calorie malnutrition. Plan: careful hand feeding when awake.    Problem/Plan - 3:  ·  Problem: Dementia. Plan: end stage.    Problem/Plan - 4:  ·  Problem: Hypothyroid. Plan: cont IV synthroid for now.    Problem/Plan - 5:  ·  Problem: Hypertension. Plan: cont to monitor.    Problem/Plan - 6:  Problem: Pancreatic tumor.Plan: no intervention.    Problem/Plan - 7:  ·  Problem: Gram-negative pneumonia. Plan: cont zosyn.    Problem/Plan - 8:  ·  Problem: Sepsis.  Plan: resolving.     Attending Attestation:   hospice to meet with family today 89 yo female with ho dementia, panc mass a/w UTI and severe hypernatremia. Family does not wish for aggressive care    Pt for hospice comfort care    Problem/Plan - 1:  ·  Problem: Hypernatremia.  Plan: will change to D5W  d/w family, no interventions including NG or PEG.     Problem/Plan - 2:  ·  Problem: Severe protein-calorie malnutrition. Plan: careful hand feeding when awake.    Problem/Plan - 3:  ·  Problem: Dementia. Plan: end stage.    Problem/Plan - 4:  ·  Problem: Hypothyroid. Plan: cont IV synthroid for now.    Problem/Plan - 5:  ·  Problem: Hypertension. Plan: cont to monitor.    Problem/Plan - 6:  Problem: Pancreatic tumor.Plan: no intervention.    Problem/Plan - 7:  ·  Problem: Gram-negative pneumonia. Plan: cont zosyn., completed course    Problem/Plan - 8:  ·  Problem: Sepsis.  Plan: resolving.     Attending Attestation:   hospice to meet with family today     d.c with home hospice  35 min spent with dc planning

## 2017-12-21 NOTE — DISCHARGE NOTE ADULT - MEDICATION SUMMARY - MEDICATIONS TO TAKE
I will START or STAY ON the medications listed below when I get home from the hospital:    acetaminophen 325 mg oral tablet  -- 2 tab(s) by mouth every 6 hours, As needed, Mild Pain (1 - 3) as able to tolerate  -- Indication: For Pain    mirtazapine 15 mg oral tablet, disintegrating  -- 1 tab(s) by mouth once a day (at bedtime) as able to tolerate  -- Indication: For Antidepressant    metoprolol succinate 25 mg oral tablet, extended release  -- 1 tab(s) by mouth once a day as able to tolerate  -- Indication: For HTN    famotidine 20 mg oral tablet  -- 1 tab(s) by mouth every 12 hours as able to tolerate  -- Indication: For GERD    docusate sodium 10 mg/mL oral liquid  -- 10 milliliter(s) by mouth 2 times a day as able to tolerate  -- Indication: For Constipation    senna 8.8 mg/5 mL oral syrup  -- 5 milliliter(s) by mouth once a day (at bedtime) as able to tolerate  -- Indication: For Constipation    melatonin 3 mg oral tablet  -- 1 tab(s) by mouth once a day (at bedtime), As needed, Insomnia as able to tolerate  -- Indication: For Insomnia    levothyroxine 25 mcg (0.025 mg) oral capsule  -- 1 cap(s) by mouth once a day as able to tolerate  -- Indication: For Hypothyroid I will START or STAY ON the medications listed below when I get home from the hospital:    acetaminophen 325 mg oral tablet  -- 2 tab(s) by mouth every 6 hours, As needed, Mild Pain (1 - 3) as able to tolerate  -- Indication: For Pain    mirtazapine 15 mg oral tablet, disintegrating  -- 1 tab(s) by mouth once a day (at bedtime) as able to tolerate  -- Indication: For Antidepressant    metoprolol succinate 25 mg oral tablet, extended release  -- 1 tab(s) by mouth once a day as able to tolerate  -- Indication: For HTN    famotidine 20 mg oral tablet  -- 1 tab(s) by mouth every 12 hours as able to tolerate  -- Indication: For BPH    docusate sodium 10 mg/mL oral liquid  -- 10 milliliter(s) by mouth 2 times a day as able to tolerate  -- Indication: For Constipation    senna 8.8 mg/5 mL oral syrup  -- 5 milliliter(s) by mouth once a day (at bedtime) as able to tolerate  -- Indication: For Constipation    melatonin 3 mg oral tablet  -- 1 tab(s) by mouth once a day (at bedtime), As needed, Insomnia as able to tolerate  -- Indication: For Insomnia    levothyroxine 25 mcg (0.025 mg) oral capsule  -- 1 cap(s) by mouth once a day as able to tolerate  -- Indication: For Hypothyroid

## 2017-12-21 NOTE — PROGRESS NOTE ADULT - ATTENDING COMMENTS
d/w daughter Winter regarding pts poor condition and prognosis.  She is agreeable to have hospice eval
hospice to meet with family today

## 2017-12-22 VITALS
OXYGEN SATURATION: 94 % | RESPIRATION RATE: 16 BRPM | HEART RATE: 115 BPM | TEMPERATURE: 99 F | SYSTOLIC BLOOD PRESSURE: 152 MMHG | DIASTOLIC BLOOD PRESSURE: 68 MMHG

## 2017-12-22 RX ORDER — MIRTAZAPINE 45 MG/1
1 TABLET, ORALLY DISINTEGRATING ORAL
Qty: 0 | Refills: 0 | COMMUNITY

## 2017-12-22 RX ORDER — LEVOTHYROXINE SODIUM 125 MCG
1 TABLET ORAL
Qty: 30 | Refills: 0 | OUTPATIENT
Start: 2017-12-22 | End: 2018-01-20

## 2017-12-22 RX ORDER — METOPROLOL TARTRATE 50 MG
1 TABLET ORAL
Qty: 0 | Refills: 0 | COMMUNITY

## 2017-12-22 RX ORDER — DOCUSATE SODIUM 100 MG
10 CAPSULE ORAL
Qty: 650 | Refills: 0 | OUTPATIENT
Start: 2017-12-22 | End: 2018-01-20

## 2017-12-22 RX ORDER — DOCUSATE SODIUM 100 MG
10 CAPSULE ORAL
Qty: 0 | Refills: 0 | COMMUNITY
Start: 2017-12-22

## 2017-12-22 RX ORDER — ACETAMINOPHEN 500 MG
2 TABLET ORAL
Qty: 0 | Refills: 0 | COMMUNITY
Start: 2017-12-22

## 2017-12-22 RX ORDER — METOPROLOL TARTRATE 50 MG
1 TABLET ORAL
Qty: 30 | Refills: 0 | OUTPATIENT
Start: 2017-12-22 | End: 2018-01-20

## 2017-12-22 RX ORDER — LANOLIN ALCOHOL/MO/W.PET/CERES
1 CREAM (GRAM) TOPICAL
Qty: 30 | Refills: 0 | OUTPATIENT
Start: 2017-12-22 | End: 2018-01-20

## 2017-12-22 RX ORDER — ACETAMINOPHEN 500 MG
2 TABLET ORAL
Qty: 240 | Refills: 0 | OUTPATIENT
Start: 2017-12-22 | End: 2018-01-20

## 2017-12-22 RX ORDER — POTASSIUM CHLORIDE 20 MEQ
40 PACKET (EA) ORAL ONCE
Qty: 0 | Refills: 0 | Status: DISCONTINUED | OUTPATIENT
Start: 2017-12-22 | End: 2017-12-22

## 2017-12-22 RX ORDER — SENNA PLUS 8.6 MG/1
5 TABLET ORAL
Qty: 200 | Refills: 0 | OUTPATIENT
Start: 2017-12-22 | End: 2018-01-20

## 2017-12-22 RX ORDER — SENNA PLUS 8.6 MG/1
5 TABLET ORAL
Qty: 0 | Refills: 0 | COMMUNITY
Start: 2017-12-22

## 2017-12-22 RX ORDER — MIRTAZAPINE 45 MG/1
1 TABLET, ORALLY DISINTEGRATING ORAL
Qty: 30 | Refills: 0 | OUTPATIENT
Start: 2017-12-22 | End: 2018-01-20

## 2017-12-22 RX ORDER — LANOLIN ALCOHOL/MO/W.PET/CERES
1 CREAM (GRAM) TOPICAL
Qty: 0 | Refills: 0 | COMMUNITY
Start: 2017-12-22

## 2017-12-22 RX ORDER — FAMOTIDINE 10 MG/ML
1 INJECTION INTRAVENOUS
Qty: 60 | Refills: 0 | OUTPATIENT
Start: 2017-12-22 | End: 2018-01-20

## 2017-12-22 RX ORDER — LEVOTHYROXINE SODIUM 125 MCG
1 TABLET ORAL
Qty: 0 | Refills: 0 | COMMUNITY

## 2017-12-22 RX ADMIN — HEPARIN SODIUM 5000 UNIT(S): 5000 INJECTION INTRAVENOUS; SUBCUTANEOUS at 05:47

## 2017-12-22 RX ADMIN — Medication 12.5 MICROGRAM(S): at 05:46

## 2017-12-22 RX ADMIN — Medication 100 MILLIGRAM(S): at 05:47

## 2017-12-24 LAB
CULTURE RESULTS: SIGNIFICANT CHANGE UP
CULTURE RESULTS: SIGNIFICANT CHANGE UP
SPECIMEN SOURCE: SIGNIFICANT CHANGE UP
SPECIMEN SOURCE: SIGNIFICANT CHANGE UP

## 2017-12-28 DIAGNOSIS — E43 UNSPECIFIED SEVERE PROTEIN-CALORIE MALNUTRITION: ICD-10-CM

## 2017-12-28 DIAGNOSIS — Z74.01 BED CONFINEMENT STATUS: ICD-10-CM

## 2017-12-28 DIAGNOSIS — N39.0 URINARY TRACT INFECTION, SITE NOT SPECIFIED: ICD-10-CM

## 2017-12-28 DIAGNOSIS — N17.9 ACUTE KIDNEY FAILURE, UNSPECIFIED: ICD-10-CM

## 2017-12-28 DIAGNOSIS — G93.41 METABOLIC ENCEPHALOPATHY: ICD-10-CM

## 2017-12-28 DIAGNOSIS — I10 ESSENTIAL (PRIMARY) HYPERTENSION: ICD-10-CM

## 2017-12-28 DIAGNOSIS — F03.90 UNSPECIFIED DEMENTIA WITHOUT BEHAVIORAL DISTURBANCE: ICD-10-CM

## 2017-12-28 DIAGNOSIS — E87.0 HYPEROSMOLALITY AND HYPERNATREMIA: ICD-10-CM

## 2017-12-28 DIAGNOSIS — A41.9 SEPSIS, UNSPECIFIED ORGANISM: ICD-10-CM

## 2017-12-28 DIAGNOSIS — Z66 DO NOT RESUSCITATE: ICD-10-CM

## 2017-12-28 DIAGNOSIS — E86.0 DEHYDRATION: ICD-10-CM

## 2017-12-28 DIAGNOSIS — J15.6 PNEUMONIA DUE TO OTHER GRAM-NEGATIVE BACTERIA: ICD-10-CM

## 2017-12-28 DIAGNOSIS — E03.9 HYPOTHYROIDISM, UNSPECIFIED: ICD-10-CM

## 2017-12-28 DIAGNOSIS — R62.7 ADULT FAILURE TO THRIVE: ICD-10-CM

## 2018-07-08 NOTE — PATIENT PROFILE ADULT. - PMH
home
Bladder prolapse, female, acquired    Dementia    Hypertension    Hypothyroid    Pancreatic tumor

## 2020-10-28 NOTE — INPATIENT CERTIFICATION FOR MEDICARE PATIENTS - PHYSICIAN CONCUR
I concur with the Admission Order and I certify that services are provided in accordance with Section 42 CFR § 412.3 spouse

## 2021-02-07 NOTE — ED PROVIDER NOTE - PSYCHIATRIC, MLM
Severe protein-calorie malnutrition Alert and oriented to person, place, time/situation. normal mood and affect. no apparent risk to self or others.

## 2022-09-20 NOTE — PATIENT PROFILE ADULT. - URINARY CATHETER
Please tell pt , that we can hold off on uptake scan, as Thyroid function test is improved     Kelle Mujica MD no

## 2023-02-07 NOTE — PATIENT PROFILE ADULT. - STAGE
How Severe Are Your Hives?: moderate Please Select The Phrase That Best Describes Your Hives.: individual welts do not stay in the same place for more than 24 hours Is This A New Presentation, Or A Follow-Up?: Hives Stage II

## 2023-09-12 NOTE — ED ADULT NURSE NOTE - FALLEN IN THE PAST
09/12/23 1341   LOPEZ Message   Medicare Outpatient and Observation Notification regarding financial responsibility Given to patient/caregiver;Explained to patient/caregiver;Signed/date by patient/caregiver   Date LOPEZ was signed 09/12/23   Time LOPEZ was signed 0112     LOPEZ signed by Pt.   no

## 2025-02-17 NOTE — PATIENT PROFILE ADULT. - FUNCTIONAL SCREEN CURRENT LEVEL: DRESSING, MLM
Problem: Chronic Conditions and Co-morbidities  Goal: Patient's chronic conditions and co-morbidity symptoms are monitored and maintained or improved  2/17/2025 1809 by Rina Bradley RN  Outcome: Progressing  2/17/2025 1641 by Rina Bradley RN  Outcome: Progressing  2/17/2025 1641 by Rina Bradley RN  Outcome: Progressing     Problem: Discharge Planning  Goal: Discharge to home or other facility with appropriate resources  2/17/2025 1809 by Rina Bradley RN  Outcome: Progressing  2/17/2025 1641 by Rina Bradley RN  Outcome: Progressing  2/17/2025 1641 by Rina Bradley RN  Outcome: Progressing     Problem: Pain  Goal: Verbalizes/displays adequate comfort level or baseline comfort level  2/17/2025 1809 by Rina Bradley RN  Outcome: Progressing  2/17/2025 1641 by Rina Bradley RN  Outcome: Progressing  2/17/2025 1641 by Rina Bradley RN  Outcome: Progressing     Problem: Skin/Tissue Integrity  Goal: Skin integrity remains intact  Description: 1.  Monitor for areas of redness and/or skin breakdown  2.  Assess vascular access sites hourly  3.  Every 4-6 hours minimum:  Change oxygen saturation probe site  4.  Every 4-6 hours:  If on nasal continuous positive airway pressure, respiratory therapy assess nares and determine need for appliance change or resting period  2/17/2025 1809 by Rina Bradley RN  Outcome: Progressing  2/17/2025 1641 by Rina Bradley RN  Outcome: Progressing     Problem: ABCDS Injury Assessment  Goal: Absence of physical injury  2/17/2025 1809 by Rina Bradley RN  Outcome: Progressing  2/17/2025 1641 by Rina Bradley RN  Outcome: Progressing     Problem: Safety - Adult  Goal: Free from fall injury  2/17/2025 1809 by Rina Bradley RN  Outcome: Progressing  2/17/2025 1641 by Rina Bradley RN  Outcome: Progressing     Problem: Nutrition Deficit:  Goal: Optimize nutritional status  2/17/2025 1809 by Rina Bradley RN  Outcome: Progressing  2/17/2025 1641 by Rina Bradley  (4) completely dependent